# Patient Record
Sex: FEMALE | Race: BLACK OR AFRICAN AMERICAN | Employment: FULL TIME | URBAN - METROPOLITAN AREA
[De-identification: names, ages, dates, MRNs, and addresses within clinical notes are randomized per-mention and may not be internally consistent; named-entity substitution may affect disease eponyms.]

---

## 2019-07-12 ENCOUNTER — OFFICE VISIT (OUTPATIENT)
Dept: CARDIOLOGY CLINIC | Age: 29
End: 2019-07-12

## 2019-07-12 VITALS
SYSTOLIC BLOOD PRESSURE: 116 MMHG | OXYGEN SATURATION: 99 % | HEART RATE: 65 BPM | HEIGHT: 67 IN | DIASTOLIC BLOOD PRESSURE: 68 MMHG | RESPIRATION RATE: 14 BRPM | BODY MASS INDEX: 30.76 KG/M2 | WEIGHT: 196 LBS

## 2019-07-12 DIAGNOSIS — R00.2 PALPITATIONS: Primary | ICD-10-CM

## 2019-07-12 DIAGNOSIS — R94.31 ABNORMAL EKG: ICD-10-CM

## 2019-07-12 PROBLEM — I45.6 WPW (WOLFF-PARKINSON-WHITE SYNDROME): Status: ACTIVE | Noted: 2019-07-12

## 2019-07-12 RX ORDER — GUAIFENESIN 100 MG/5ML
81 LIQUID (ML) ORAL DAILY
COMMUNITY
End: 2020-10-28

## 2019-07-12 NOTE — PROGRESS NOTES
Visit Vitals  /68 (BP 1 Location: Right arm, BP Patient Position: Sitting)   Pulse 65   Resp 14   Ht 5' 7\" (1.702 m)   Wt 196 lb (88.9 kg)   SpO2 99%   BMI 30.70 kg/m²

## 2019-07-12 NOTE — PATIENT INSTRUCTIONS
You will be schedued for a 48 hour Holter and an echocardiogram. You will be scheduled to see Dr. Denia Smith.

## 2019-07-12 NOTE — PROGRESS NOTES
HISTORY OF PRESENT ILLNESS  Lalit Castillo is a 30 y.o. female. With no remarkable PMH presents here for palpitations and abnormal ecg  Past Medical History:   Diagnosis Date    Anemia NEC      No past surgical history on file. Social History     Tobacco Use    Smoking status: Never Smoker    Smokeless tobacco: Never Used   Substance Use Topics    Alcohol use: Yes     Alcohol/week: 0.5 oz     Types: 1 drink(s) per week    Drug use: No   FH: no fh for SCD     one uncle with CMP and AICD    HPI  In the last 3 weeks she has noticed palpitations with and without activities   no cp or sob or presyncope or syncope    Review of Systems   Respiratory: Negative. Cardiovascular: Positive for palpitations. Negative for chest pain, orthopnea, claudication, leg swelling and PND. Physical Exam  Physical Exam   Blood pressure 116/68, pulse 65, resp. rate 14, height 5' 7\" (1.702 m), weight 196 lb (88.9 kg), SpO2 99 %. Constitutional: She is oriented to person, place, and time. She appears well-developed and well-nourished. No distress. HENT: Head: Normocephalic. Eyes: No scleral icterus. Neck: Normal range of motion. Neck supple. No JVD present. No tracheal deviation present. Cardiovascular: Normal rate, regular rhythm, normal heart sounds and intact distal pulses. Exam reveals no gallop and no friction rub. No murmur heard. Pulmonary/Chest: Effort normal and breath sounds normal. No stridor. No respiratory distress, wheezes or  rales. Abdominal: She exhibits no distension. Musculoskeletal: She exhibits no edema. Neurological: She is alert and oriented to person, place, and time. Coordination normal.   Skin: Skin is warm. No rash noted. Not diaphoretic. No erythema. Psychiatric:  Normal mood and affect. Behavior is normal.   Current Outpatient Medications on File Prior to Visit   Medication Sig Dispense Refill    aspirin 81 mg chewable tablet Take 81 mg by mouth daily.        No current facility-administered medications on file prior to visit.         ASSESSMENT and PLAN  Palpitations: I suspect WPW based on ecg   proceed with echo and 48 hour holter and refer to EP for potential ablation  Discussed with her that this is a preliminary diagnosis but also potenial significance of wpw discussed  No caffeine for now   See her back after tests  to er if sustained palpitations  ETT also a consideration but hold for ep eval first

## 2019-07-19 ENCOUNTER — CLINICAL SUPPORT (OUTPATIENT)
Dept: CARDIOLOGY CLINIC | Age: 29
End: 2019-07-19

## 2019-07-19 DIAGNOSIS — R94.31 ABNORMAL EKG: ICD-10-CM

## 2019-07-19 DIAGNOSIS — R00.2 PALPITATIONS: ICD-10-CM

## 2019-07-23 ENCOUNTER — TELEPHONE (OUTPATIENT)
Dept: CARDIOLOGY CLINIC | Age: 29
End: 2019-07-23

## 2019-07-31 ENCOUNTER — TELEPHONE (OUTPATIENT)
Dept: CARDIOLOGY CLINIC | Age: 29
End: 2019-07-31

## 2019-08-08 ENCOUNTER — OFFICE VISIT (OUTPATIENT)
Dept: CARDIOLOGY CLINIC | Age: 29
End: 2019-08-08

## 2019-08-08 VITALS
OXYGEN SATURATION: 99 % | RESPIRATION RATE: 18 BRPM | SYSTOLIC BLOOD PRESSURE: 120 MMHG | HEIGHT: 67 IN | HEART RATE: 71 BPM | DIASTOLIC BLOOD PRESSURE: 70 MMHG | BODY MASS INDEX: 30.29 KG/M2 | WEIGHT: 193 LBS

## 2019-08-08 DIAGNOSIS — I45.6 WPW (WOLFF-PARKINSON-WHITE SYNDROME): Primary | ICD-10-CM

## 2019-08-08 DIAGNOSIS — R00.2 PALPITATIONS: ICD-10-CM

## 2019-08-08 RX ORDER — ERGOCALCIFEROL 1.25 MG/1
50000 CAPSULE ORAL
COMMUNITY
Start: 2015-05-28 | End: 2020-10-28

## 2019-08-08 NOTE — PROGRESS NOTES
Cardiac Electrophysiology OFFICE Note Subjective:  
  
Dmitry Hector is a 30 y.o. patient who is seen for evaluation of palpitations & preexcitation noted on ECG. She was kindly referred by Dr. Kristi Ba. Recent holter monitor showed preexcitation with rare PVCs. States she was not particularly symptomatic while wearing monitor. She initially saw Dr. Kristi Ba after several weeks of frequent palpitations. States lasted approx 5 seconds at a time, occurred multiple times daily, always at rest. 
 
States she has not had any episodes in the past week. She denies chest pain, PND, orthopnea, lightheadedness, syncope, or edema. Active, runs 1.5-2 miles approx 3 times per week. Plans on going into the [facility], likely in 2020. Father present at appointment today. Previous: 
Echo (2019): LVEF 61-65%, no RWMA. Trace  Holter (2019): Sinus saeed to sinus tachy with sinus arrhythmia, preexcitation. Rare PVCs. Uncle with cardiomyopathy & ICD. PGF  of MI in old age. No family history of arrhythmia. Problem List  Date Reviewed: 2019 Codes Class Noted WPW (Tram-Parkinson-White syndrome) ICD-10-CM: I45.6 ICD-9-CM: 426.7  2019 Current Outpatient Medications Medication Sig Dispense Refill  ergocalciferol (ERGOCALCIFEROL) 50,000 unit capsule Take 50,000 Units by mouth every seven (7) days.  aspirin 81 mg chewable tablet Take 81 mg by mouth daily. No Known Allergies Past Medical History:  
Diagnosis Date  Anemia NEC History reviewed. No pertinent surgical history. Family History Problem Relation Age of Onset  Hypertension Mother  Hypertension Father  Colon Polyps Father   
     partial colectomy  SLE Maternal Aunt  Diabetes Paternal Grandmother  SLE Maternal Aunt Social History Tobacco Use  Smoking status: Never Smoker  Smokeless tobacco: Never Used Substance Use Topics  Alcohol use: Yes Alcohol/week: 0.8 standard drinks Types: 1 drink(s) per week Review of Systems:  
Constitutional: Negative for fever, chills, weight loss, malaise/fatigue. HEENT: Negative for nosebleeds, vision changes. Respiratory: Negative for cough, hemoptysis Cardiovascular: Negative for chest pain, + recent palpitations, no orthopnea, claudication, leg swelling, syncope, and PND. Gastrointestinal: Negative for nausea, vomiting, diarrhea, blood in stool and melena. Genitourinary: Negative for dysuria, and hematuria. Musculoskeletal: Negative for myalgias, arthralgia. Skin: Negative for rash. Heme: Does not bleed or bruise easily. Neurological: Negative for speech change and focal weakness Objective:  
 
Visit Vitals /70 (BP 1 Location: Left arm, BP Patient Position: Sitting) Pulse 71 Resp 18 Ht 5' 7\" (1.702 m) Wt 193 lb (87.5 kg) SpO2 99% BMI 30.23 kg/m² Physical Exam:  
Constitutional: well-developed and well-nourished. No respiratory distress. Head: Normocephalic and atraumatic. Eyes: Pupils are equal, round ENT: hearing normal 
Neck: supple. No JVD present. Cardiovascular: Normal rate, regular rhythm. Exam reveals no gallop and no friction rub. No murmur heard. Pulmonary/Chest: Effort normal and breath sounds normal. No wheezes. Abdominal: Soft, no tenderness. Musculoskeletal: no edema. Neurological: alert,oriented. Skin: Skin is warm and dry Psychiatric: normal mood and affect. Behavior is normal. Judgment and thought content normal.   
 
EKG: Junctional, rate 71 bpm, preexcitation. Assessment/Plan: ICD-10-CM ICD-9-CM 1. Palpitations R00.2 785.1 2. Ventricular pre-excitation with arrhythmia I45.6 426.7 3. WPW (Tram-Parkinson-White syndrome) I45.6 426.7 Ms. Bedoya has had palpitations, has Tram-Parkinson-White syndrome noted on holter & ECG. Analysis of ECG shows accessory pathway, right posteroseptal location is likely. She will likely be unable to join the [employer] unless she eliminates accessory pathway. Risks/benefits of EP study & ablation discussed. She & father would like to proceed with scheduling. No future appointments. Thank you for involving me in this patient's care and please call with further concerns or questions. Naomi Olea M.D. Electrophysiology/Cardiology [facility] and [facility]ascular [address], [place] [address] [place] [address] 
[phone]                                        [phone]

## 2019-08-08 NOTE — PROGRESS NOTES
Cardiac Electrophysiology OFFICE Consult Note     Subjective:      Lalit Castillo is a 30 y.o. patient who is seen for evaluation of palpitations & preexcitation noted on ECG. She was kindly referred by Dr. Tristian Gibbs. Recent holter monitor showed preexcitation with rare PVCs. States she was not particularly symptomatic while wearing monitor. She initially saw Dr. Tristian Gibbs after several weeks of frequent palpitations. States lasted approx 5 seconds at a time, occurred multiple times daily, always at rest.    States she has not had any episodes in the past week. She denies chest pain, PND, orthopnea, lightheadedness, syncope, or edema. Active, runs 1.5-2 miles approx 3 times per week. Plans on going into the [facility], likely in 2020. Father present at appointment today. Previous:  Echo (2019): LVEF 61-65%, no RWMA. Trace MR. Holter (2019): Sinus saeed to sinus tachy with sinus arrhythmia, preexcitation. Rare PVCs. Uncle with cardiomyopathy & ICD. PGF  of MI in old age. No family history of arrhythmia. Problem List  Date Reviewed: 2019          Codes Class Noted    WPW (Tram-Parkinson-White syndrome) ICD-10-CM: I45.6  ICD-9-CM: 426.7  2019              Current Outpatient Medications   Medication Sig Dispense Refill    ergocalciferol (ERGOCALCIFEROL) 50,000 unit capsule Take 50,000 Units by mouth every seven (7) days.  aspirin 81 mg chewable tablet Take 81 mg by mouth daily. No Known Allergies  Past Medical History:   Diagnosis Date    Anemia NEC      History reviewed. No past surgical history.   Family History   Problem Relation Age of Onset   [address] Hypertension Mother     Hypertension Father     Colon Polyps Father         partial colectomy    SLE Maternal Aunt     Diabetes Paternal Grandmother     SLE Maternal Aunt      Social History     Tobacco Use    Smoking status: Never Smoker    Smokeless tobacco: Never Used   Substance Use Topics    Alcohol use: Yes     Alcohol/week: 0.8 standard drinks     Types: 1 drink(s) per week        Review of Systems:   Constitutional: Negative for fever, chills, weight loss, malaise/fatigue. HEENT: Negative for nosebleeds, vision changes. Respiratory: Negative for cough, hemoptysis  Cardiovascular: Negative for chest pain, + recent palpitations, no orthopnea, claudication, leg swelling, syncope, and PND. Gastrointestinal: Negative for nausea, vomiting, diarrhea, blood in stool and melena. Genitourinary: Negative for dysuria, and hematuria. Musculoskeletal: Negative for myalgias, arthralgia. Skin: Negative for rash. Heme: Does not bleed or bruise easily. Neurological: Negative for speech change and focal weakness     Objective:     Visit Vitals  /70 (BP 1 Location: Left arm, BP Patient Position: Sitting)   Pulse 71   Resp 18   Ht 5' 7\" (1.702 m)   Wt 193 lb (87.5 kg)   SpO2 99%   BMI 30.23 kg/m²      Physical Exam:   Constitutional: well-developed and well-nourished. No respiratory distress. Head: Normocephalic and atraumatic. Eyes: Pupils are equal, round  ENT: hearing normal  Neck: supple. No JVD present. Cardiovascular: Normal rate, regular rhythm. Exam reveals no gallop and no friction rub. No murmur heard. Pulmonary/Chest: Effort normal and breath sounds normal. No wheezes. Abdominal: Soft, no tenderness. Musculoskeletal: no edema. Neurological: alert,oriented. Skin: Skin is warm and dry  Psychiatric: normal mood and affect. Behavior is normal. Judgment and thought content normal.      EKG: NSR with preexcitation. Assessment/Plan:        ICD-10-CM ICD-9-CM    1. WPW (Tram-Parkinson-White syndrome) I45.6 426.7 CBC WITH AUTOMATED DIFF      METABOLIC PANEL, BASIC   2. Palpitations R00.2 785.1 CBC WITH AUTOMATED DIFF      METABOLIC PANEL, BASIC     Ms. Lucrecia Lamb has had palpitations, has Tram-Parkinson-White syndrome noted on holter & ECG and palpitation.   ECG shows preexcitation, probably right posteroseptal accessory pathway  She wants to join the [employer] and there is also indication for ablation  I discussed risks and benefits and she and her father want to proceed with EP study and ablation      Future Appointments   Date Time Provider Purvi Hogue   10/24/2019 10:40 AM Roberto Guevara  [address]         Thank you for involving me in this patient's care and please call with further concerns or questions. Deneen Larson M.D.   Electrophysiology/Cardiology  [facility] and Vascular [place]  [address], [place] [address] , [facility] 91  [address] [facility], [address]                             [address] [place] [address]  [phone]

## 2019-08-08 NOTE — PATIENT INSTRUCTIONS
Your SVT ablation procedure has been scheduled for 10/11/2019 at 1:00 pm, at [facility].    Please report to Admitting Department by 11:00, or 2 hours prior to your scheduled procedure. Please bring a list of your current medications and medication bottles, if able, to the hospital on this day. You will be unable to drive after your procedure so please make sure to bring someone with you to your procedure. You will need to have nothing to eat or drink after midnight, the night prior to your procedure. You may have small sips of water, if needed, to take with your medication. You will need labs drawn prior to your procedure. Please go to [facility] to have this done no sooner than 9/11/2019 and no later than 10/4/2019. You should Not stop your medication. After your procedure, you will need to follow up with Dr. Fabiola Hobson.  Your follow-up appointment has been scheduled for 10/24/2019 at 10:40 am.

## 2019-09-19 LAB
BASOPHILS # BLD AUTO: 0 X10E3/UL (ref 0–0.2)
BASOPHILS NFR BLD AUTO: 1 %
BUN SERPL-MCNC: 6 MG/DL (ref 6–20)
BUN/CREAT SERPL: 9 (ref 9–23)
CALCIUM SERPL-MCNC: 9.4 MG/DL (ref 8.7–10.2)
CHLORIDE SERPL-SCNC: 100 MMOL/L (ref 96–106)
CO2 SERPL-SCNC: 20 MMOL/L (ref 20–29)
CREAT SERPL-MCNC: 0.69 MG/DL (ref 0.57–1)
EOSINOPHIL # BLD AUTO: 0.1 X10E3/UL (ref 0–0.4)
EOSINOPHIL NFR BLD AUTO: 1 %
ERYTHROCYTE [DISTWIDTH] IN BLOOD BY AUTOMATED COUNT: 14.7 % (ref 12.3–15.4)
GLUCOSE SERPL-MCNC: 88 MG/DL (ref 65–99)
HCT VFR BLD AUTO: 32.5 % (ref 34–46.6)
HGB BLD-MCNC: 10.5 G/DL (ref 11.1–15.9)
IMM GRANULOCYTES # BLD AUTO: 0 X10E3/UL (ref 0–0.1)
IMM GRANULOCYTES NFR BLD AUTO: 0 %
LYMPHOCYTES # BLD AUTO: 2 X10E3/UL (ref 0.7–3.1)
LYMPHOCYTES NFR BLD AUTO: 23 %
MCH RBC QN AUTO: 26.7 PG (ref 26.6–33)
MCHC RBC AUTO-ENTMCNC: 32.3 G/DL (ref 31.5–35.7)
MCV RBC AUTO: 83 FL (ref 79–97)
MONOCYTES # BLD AUTO: 0.7 X10E3/UL (ref 0.1–0.9)
MONOCYTES NFR BLD AUTO: 8 %
NEUTROPHILS # BLD AUTO: 5.8 X10E3/UL (ref 1.4–7)
NEUTROPHILS NFR BLD AUTO: 67 %
PLATELET # BLD AUTO: 353 X10E3/UL (ref 150–450)
POTASSIUM SERPL-SCNC: 4.5 MMOL/L (ref 3.5–5.2)
RBC # BLD AUTO: 3.93 X10E6/UL (ref 3.77–5.28)
SODIUM SERPL-SCNC: 138 MMOL/L (ref 134–144)
WBC # BLD AUTO: 8.6 X10E3/UL (ref 3.4–10.8)

## 2019-09-20 NOTE — PROGRESS NOTES
Mildly anemic. Otherwise without significant lab abnormalities. OK to proceed with upcoming EP study +/- ablation as scheduled.

## 2019-10-02 RX ORDER — SODIUM CHLORIDE 0.9 % (FLUSH) 0.9 %
5-40 SYRINGE (ML) INJECTION AS NEEDED
Status: CANCELLED | OUTPATIENT
Start: 2019-10-02

## 2019-10-02 RX ORDER — SODIUM CHLORIDE 0.9 % (FLUSH) 0.9 %
5-40 SYRINGE (ML) INJECTION EVERY 8 HOURS
Status: CANCELLED | OUTPATIENT
Start: 2019-10-02

## 2019-10-11 ENCOUNTER — HOSPITAL ENCOUNTER (OUTPATIENT)
Age: 29
Setting detail: OBSERVATION
Discharge: HOME | End: 2019-10-11
Attending: INTERNAL MEDICINE | Admitting: INTERNAL MEDICINE
Payer: COMMERCIAL

## 2019-10-11 VITALS
OXYGEN SATURATION: 100 % | DIASTOLIC BLOOD PRESSURE: 65 MMHG | TEMPERATURE: 98.4 F | HEART RATE: 85 BPM | RESPIRATION RATE: 16 BRPM | SYSTOLIC BLOOD PRESSURE: 116 MMHG | HEIGHT: 67 IN | BODY MASS INDEX: 29.03 KG/M2 | WEIGHT: 185 LBS

## 2019-10-11 DIAGNOSIS — I45.6 PRE-EXCITATION ATRIOVENTRICULAR CONDUCTION: ICD-10-CM

## 2019-10-11 PROBLEM — Z98.890 S/P ABLATION OF ACCESSORY BYPASS TRACT: Status: ACTIVE | Noted: 2019-10-11

## 2019-10-11 PROBLEM — I47.1 SVT (SUPRAVENTRICULAR TACHYCARDIA) (HCC): Status: ACTIVE | Noted: 2019-10-11

## 2019-10-11 PROCEDURE — 99152 MOD SED SAME PHYS/QHP 5/>YRS: CPT | Performed by: INTERNAL MEDICINE

## 2019-10-11 PROCEDURE — C1894 INTRO/SHEATH, NON-LASER: HCPCS | Performed by: INTERNAL MEDICINE

## 2019-10-11 PROCEDURE — 93653 COMPRE EP EVAL TX SVT: CPT | Performed by: INTERNAL MEDICINE

## 2019-10-11 PROCEDURE — 99153 MOD SED SAME PHYS/QHP EA: CPT | Performed by: INTERNAL MEDICINE

## 2019-10-11 PROCEDURE — C1730 CATH, EP, 19 OR FEW ELECT: HCPCS | Performed by: INTERNAL MEDICINE

## 2019-10-11 PROCEDURE — 77030016899 HC CBL EP EXT4 BSC -B: Performed by: INTERNAL MEDICINE

## 2019-10-11 PROCEDURE — 77030033352 HC TBNG IRR PMP COOL PNT STJU -B: Performed by: INTERNAL MEDICINE

## 2019-10-11 PROCEDURE — 93613 INTRACARDIAC EPHYS 3D MAPG: CPT | Performed by: INTERNAL MEDICINE

## 2019-10-11 PROCEDURE — C1733 CATH, EP, OTHR THAN COOL-TIP: HCPCS | Performed by: INTERNAL MEDICINE

## 2019-10-11 PROCEDURE — 74011250636 HC RX REV CODE- 250/636: Performed by: INTERNAL MEDICINE

## 2019-10-11 PROCEDURE — 93623 PRGRMD STIMJ&PACG IV RX NFS: CPT | Performed by: INTERNAL MEDICINE

## 2019-10-11 PROCEDURE — 77030016894 HC CBL EP DX CATH3 STJU -B: Performed by: INTERNAL MEDICINE

## 2019-10-11 PROCEDURE — 99218 HC RM OBSERVATION: CPT

## 2019-10-11 PROCEDURE — 74011000250 HC RX REV CODE- 250: Performed by: INTERNAL MEDICINE

## 2019-10-11 PROCEDURE — C2630 CATH, EP, COOL-TIP: HCPCS | Performed by: INTERNAL MEDICINE

## 2019-10-11 PROCEDURE — 77030010872 HC CBL EP BSC -C: Performed by: INTERNAL MEDICINE

## 2019-10-11 RX ORDER — HYDROCODONE BITARTRATE AND ACETAMINOPHEN 5; 325 MG/1; MG/1
1 TABLET ORAL
Status: DISCONTINUED | OUTPATIENT
Start: 2019-10-11 | End: 2019-10-12 | Stop reason: HOSPADM

## 2019-10-11 RX ORDER — LIDOCAINE HYDROCHLORIDE 10 MG/ML
INJECTION INFILTRATION; PERINEURAL AS NEEDED
Status: DISCONTINUED | OUTPATIENT
Start: 2019-10-11 | End: 2019-10-11 | Stop reason: HOSPADM

## 2019-10-11 RX ORDER — SODIUM CHLORIDE 0.9 % (FLUSH) 0.9 %
5-40 SYRINGE (ML) INJECTION EVERY 8 HOURS
Status: DISCONTINUED | OUTPATIENT
Start: 2019-10-11 | End: 2019-10-12 | Stop reason: HOSPADM

## 2019-10-11 RX ORDER — ONDANSETRON 2 MG/ML
4 INJECTION INTRAMUSCULAR; INTRAVENOUS
Status: DISCONTINUED | OUTPATIENT
Start: 2019-10-11 | End: 2019-10-12 | Stop reason: HOSPADM

## 2019-10-11 RX ORDER — PHENAZOPYRIDINE HYDROCHLORIDE 95 MG/1
95 TABLET ORAL
COMMUNITY
End: 2020-10-28

## 2019-10-11 RX ORDER — SODIUM CHLORIDE 0.9 % (FLUSH) 0.9 %
5-40 SYRINGE (ML) INJECTION EVERY 8 HOURS
Status: DISCONTINUED | OUTPATIENT
Start: 2019-10-11 | End: 2019-10-11 | Stop reason: HOSPADM

## 2019-10-11 RX ORDER — SODIUM CHLORIDE 0.9 % (FLUSH) 0.9 %
5-40 SYRINGE (ML) INJECTION AS NEEDED
Status: DISCONTINUED | OUTPATIENT
Start: 2019-10-11 | End: 2019-10-11 | Stop reason: HOSPADM

## 2019-10-11 RX ORDER — FENTANYL CITRATE 50 UG/ML
INJECTION, SOLUTION INTRAMUSCULAR; INTRAVENOUS AS NEEDED
Status: DISCONTINUED | OUTPATIENT
Start: 2019-10-11 | End: 2019-10-11 | Stop reason: HOSPADM

## 2019-10-11 RX ORDER — MIDAZOLAM HYDROCHLORIDE 1 MG/ML
INJECTION, SOLUTION INTRAMUSCULAR; INTRAVENOUS AS NEEDED
Status: DISCONTINUED | OUTPATIENT
Start: 2019-10-11 | End: 2019-10-11 | Stop reason: HOSPADM

## 2019-10-11 RX ORDER — ACETAMINOPHEN 325 MG/1
650 TABLET ORAL
Status: DISCONTINUED | OUTPATIENT
Start: 2019-10-11 | End: 2019-10-12 | Stop reason: HOSPADM

## 2019-10-11 RX ORDER — SODIUM CHLORIDE 0.9 % (FLUSH) 0.9 %
5-40 SYRINGE (ML) INJECTION AS NEEDED
Status: DISCONTINUED | OUTPATIENT
Start: 2019-10-11 | End: 2019-10-12 | Stop reason: HOSPADM

## 2019-10-11 NOTE — PROGRESS NOTES
Cardiac Cath Lab Recovery Arrival Note:      Terrence Moss arrived to Cardiac Cath Lab, Recovery Area. Staff introduced to patient. Patient identifiers verified with NAME and DATE OF BIRTH. Procedure verified with patient. Consent forms reviewed and signed by patient or authorized representative and verified. Allergies verified. Patient and family oriented to department. Patient and family informed of procedure and plan of care. Questions answered with review. Patient prepped for procedure, per orders from physician, prior to arrival.    Patient on cardiac monitor, non-invasive blood pressure, SPO2 monitor. On Room Air. Patient is A&Ox 4. Patient reports No Pain. Patient in stretcher, in low position, with side rails up, call bell within reach, patient instructed to call if assistance as needed. Patient prep in: [address], [place]. Family in: Waiting Room.    Prep by: Lavanda Lundborg RN

## 2019-10-11 NOTE — ROUTINE PROCESS
Cardiac Cath Lab Procedure Area Arrival Note: 
 
Anastasia García arrived to Cardiac Cath Lab, Procedure Area. Patient identifiers verified with NAME and DATE OF BIRTH. Procedure verified with patient. Consent forms verified. Allergies verified. Patient informed of procedure and plan of care. Questions answered with review. Patient voiced understanding of procedure and plan of care. Patient on cardiac monitor, non-invasive blood pressure, SPO2 monitor. On  or O2 @ 2 lpm via nasal canula. IV of 0.9% NS on pump at 25 ml/hr. Patient status doing well without problems. Patient is A&Ox 4. Patient reports no pain. Patient medicated during procedure with orders obtained and verified by Dr. Patricia Castellon. Refer to patients Cardiac Cath Lab PROCEDURE REPORT for vital signs, assessment, status, and response during procedure, printed at end of case. Printed report on chart or scanned into chart.

## 2019-10-11 NOTE — ROUTINE PROCESS
TRANSFER - OUT REPORT: 
 
Verbal report given to Moe on Le Wells being transferred to cath lab recovery for routine progression of care Report consisted of patients Situation, Background, Assessment and  
Recommendations(SBAR). Information from the following report(s) Procedure Summary was reviewed with the receiving nurse. Opportunity for questions and clarification was provided. Cardiac Cath Lab Procedure Area Arrival Note: 
 
eL Wells arrived to Cardiac Cath Lab, Procedure Area. Patient identifiers verified with NAME and DATE OF BIRTH. Procedure verified with patient. Consent forms verified. Allergies verified. Patient informed of procedure and plan of care. Questions answered with review. Patient voiced understanding of procedure and plan of care. Patient on cardiac monitor, non-invasive blood pressure, SPO2 monitor. On  or O2 @ 2 lpm via nasal canula. IV of 0.9% NS on pump at 25 ml/hr. Patient status doing well without problems. Patient is A&Ox 4. Patient reports no pain. Patient medicated during procedure with orders obtained and verified by Dr. Fidel Gallagher. Refer to patients Cardiac Cath Lab PROCEDURE REPORT for vital signs, assessment, status, and response during procedure, printed at end of case. Printed report on chart or scanned into chart.

## 2019-10-11 NOTE — PROGRESS NOTES
TRANSFER - IN REPORT:    Verbal report received from Maurilio Malloy CVT on Guanakito Robertson  being received from procedure area for routine progression of care. Report consisted of patients Situation, Background, Assessment and Recommendations(SBAR). Information from the following report(s) SBAR, Procedure Summary, MAR, Recent Results and Cardiac Rhythm NSR was reviewed with the receiving clinician. Opportunity for questions and clarification was provided. Assessment completed upon patients arrival to [address] [place] [address] and care assumed. Cardiac Cath Lab Recovery Arrival Note:    Guanakito Robertson arrived to [facility] recovery area. Patient procedure= Ablation. Patient on cardiac monitor, non-invasive blood pressure, SPO2 monitor. On  O2 @ 2 lpm via NC.  IV  of NS on pump at 25 ml/hr. Patient status doing well without problems. Patient is A&Ox 4. Patient reports No Pain. PROCEDURE SITE CHECK:    Procedure site:without any bleeding and No Hematoma, No pain/discomfort reported at procedure site. No change in patient status. Continue to monitor patient and status.

## 2019-10-11 NOTE — PROGRESS NOTES
1838hrs:  TRANSFER - IN REPORT:  Verbal report received from [place], RN(name) on [facility]  being received from CCL(unit) for routine progression of care  Report consisted of patients Situation, Background, Assessment and Recommendations(SBAR). Information from the following report(s) SBAR, Procedure Summary, Intake/Output, MAR, Recent Results, Med Rec Status and Cardiac Rhythm NSR 80's was reviewed with the receiving nurse. Opportunity for questions and clarification was provided. Assessment completed upon patients arrival to unit and care assumed.

## 2019-10-11 NOTE — DISCHARGE INSTRUCTIONS
Will arrange for treadmill exercise at follow up wear comfortable clothing       Patient Instructions Post-EP study and ablation    1. No heavy lifting or exercises until Monday 10/14/2019  This includes the following:  Do not push or move furniture, jog or run    2. Do not drive for 3 days. 3.  Call Dr. Ofelia Murrieta at [phone] if you experience any of the following symptoms:  Dizziness, lightheadedness, fainting spells  Lack of energy  Shortness of breath  Rapid heart rate  Chest or muscle twitches  Blurry vision, double vision, weakness, numbness  Nausea, vomiting  Fever  Bleeding in the stool, black stool  Leg swelling, pain    4. Follow-up with Dr. Ofelia Murrieta as noted below. Future Appointments   Date Time Provider Purvi Hogue   10/24/2019 10:40 AM MD Martell Rosa M.D.   Electrophysiology/Cardiology  [facility] and Vascular [place]  [address], [place] [address] [place] [address]  [address] [facility], [address] [place] [address]  [phone]

## 2019-10-11 NOTE — H&P
Cardiac Electrophysiology H&P Note     Subjective:      Deepa Claire is a 30 y.o. patient who presents today for planned SVT ablation. She was last seen in clinic on 2019, denies significant changes in medical history or hospitalizations since then. Recent holter monitor showed preexcitation with rare PVCs. States she was not particularly symptomatic while wearing monitor.     She initially saw Dr. Brook Armendariz after several weeks of frequent palpitations. States lasted approx 5 seconds at a time, occurred multiple times daily, always at rest.     States she has not had any episodes in the past week.     She denies chest pain, PND, orthopnea, lightheadedness, syncope, or edema.       Active, runs 1.5-2 miles approx 3 times per week.     Plans on going into the [facility], likely in 2020. Previous:  Echo (2019): LVEF 61-65%, no RWMA. Trace MR.     Holter (2019): Sinus saeed to sinus tachy with sinus arrhythmia, preexcitation. Rare PVCs.     Uncle with cardiomyopathy & ICD. PGF  of MI in old age. No family history of arrhythmia.     Primary cardiologist is Dr. Brook Armendariz. Problem List  Date Reviewed: 2019          Codes Class Noted    WPW (Tram-Parkinson-White syndrome) ICD-10-CM: I45.6  ICD-9-CM: 426.7  2019                No Known Allergies  Past Medical History:   Diagnosis Date    Anemia NEC      No past surgical history on file. Family History   Problem Relation Age of Onset   [address] [facility] [address] Hypertension Mother     Hypertension Father     Colon Polyps Father         partial colectomy    SLE Maternal Aunt     Diabetes Paternal Grandmother     SLE Maternal Aunt      Social History     Tobacco Use    Smoking status: Never Smoker    Smokeless tobacco: Never Used   Substance Use Topics    Alcohol use:  Yes     Alcohol/week: 0.8 standard drinks     Types: 1 drink(s) per week        Review of Systems:   Constitutional: Negative for fever, chills, weight loss, malaise/fatigue. HEENT: Negative for nosebleeds, vision changes. Respiratory: Negative for cough, hemoptysis  Cardiovascular: Negative for chest pain, + recent palpitations, orthopnea, claudication, leg swelling, syncope, and PND. Gastrointestinal: Negative for nausea, vomiting, diarrhea, blood in stool and melena. Genitourinary: Negative for dysuria, and hematuria. Musculoskeletal: Negative for myalgias, arthralgia. Skin: Negative for rash. Heme: Does not bleed or bruise easily. Neurological: Negative for speech change and focal weakness     Objective:        Physical Exam:   Constitutional: Well-developed and well-nourished. No respiratory distress. Head: Normocephalic and atraumatic. Eyes: Pupils are equal, round  ENT: Hearing normal  Neck: Supple. No JVD present. Cardiovascular: Normal rate, regular rhythm. Exam reveals no gallop and no friction rub. No murmur heard. Pulmonary/Chest: Effort normal and breath sounds normal. No wheezes. Abdominal: Soft, no tenderness. Musculoskeletal: No edema. Neurological: Alert,oriented. Skin: Skin is warm and dry  Psychiatric: Normal mood and affect. Behavior is normal. Judgment and thought content normal.      EKG (07/12/2019):  NSR with preexcitation. Assessment/Plan:     Ms. Desiree García has had palpitations, has Arlys Stacks White syndrome noted on holter & ECG. Preexcitation noted on ECT, likely right posteroseptal accessory pathway. She would like to join the [employer], & ablation is indicated. Risks/benefits of EP study & WPW ablation reviewed, & she agrees to proceed as scheduled.     DEEPIKA Salcedo  [facility] [place]  10/11/19    Addendum from EP attending:   I have seen, examined patient, and discussed with nurse practitioner, registered nurse, reviewed, updated note and agree with the assessment and plan    I have talked to her this am. She still has palpitation     Exam shows regular rhythm and no rub  Assessment and Plan:  Continue to proceed with EP study and possible ablation       Thank you for involving me in this patient's care and please call with further concerns or questions. Kris Carney M.D.   Electrophysiology/Cardiology  [facility] and Vascular [place]  [address], [place] [address] [place] [address] 91  [address] [facility], [address] [place] [address]  [phone]

## 2019-10-11 NOTE — PROGRESS NOTES
TRANSFER - OUT REPORT:    Verbal report given to Loyda Keller RN on Nel Desai being transferred to Room 464 for routine progression of care       Report consisted of patients Situation, Background, Assessment and   Recommendations(SBAR). Information from the following report(s) SBAR, Procedure Summary, MAR, Recent Results and Cardiac Rhythm NSR was reviewed with the receiving nurse. Opportunity for questions and clarification was provided.

## 2019-10-12 NOTE — PROGRESS NOTES
Patient and her parents agree to discharge tonight 930 pm  She lives locally in [place]  Follow up in office with treadmill exercise test  Future Appointments   Date Time Provider Purvi Hogue   10/24/2019 10:40 AM Joan Morrissey  [address]

## 2019-10-24 ENCOUNTER — OFFICE VISIT (OUTPATIENT)
Dept: CARDIOLOGY CLINIC | Age: 29
End: 2019-10-24

## 2019-10-24 VITALS
BODY MASS INDEX: 29.98 KG/M2 | HEART RATE: 87 BPM | RESPIRATION RATE: 14 BRPM | HEIGHT: 67 IN | WEIGHT: 191 LBS | DIASTOLIC BLOOD PRESSURE: 58 MMHG | SYSTOLIC BLOOD PRESSURE: 98 MMHG

## 2019-10-24 DIAGNOSIS — I45.6 WPW (WOLFF-PARKINSON-WHITE SYNDROME): Primary | ICD-10-CM

## 2019-10-24 DIAGNOSIS — Z98.890 S/P ABLATION OF ACCESSORY BYPASS TRACT: ICD-10-CM

## 2019-10-24 NOTE — PROGRESS NOTES
Cardiac Electrophysiology OFFICE Note     Subjective:      Cherise Gillespie is a 30 y.o. patient who is seen for follow up, is s/p ablation of right posteroseptal accessory pathway on 10/11/2019. No induced SVT during ablation, but accessory pathway could conduct fast in antegrade fashion, not in retrograde fashion. Ablation was performed. Pathway continued to appear with preexcitation on 12 lead ECG after each ablation with sinus rate at rest.  Post ablation, antegrade conduction pathway was slower & blocked at 540 ms atrial pacing. Risk of sudden death now low post ablation. Delta waves no longer present during exercise stress test and resting ECG today. Post ablation, she reports that she had lower activity tolerance, mild dyspnea with exertion, but that this has improved & she now feels she is back at baseline. She has not started running again. She denies chest pain, palpitations, PND, orthopnea, lightheadedness, syncope, or edema. Plans on going into the [facility], likely in 2020. Father present at appointment today. Previous:  Echo (2019): LVEF 61-65%, no RWMA. Trace MR. Holter (2019): Sinus saeed to sinus tachy with sinus arrhythmia, preexcitation. Rare PVCs. Initially saw Dr. Samia Sharpe after several weeks of frequent palpitations. States lasted approx 5 seconds at a time, occurred multiple times daily, always at rest.    Uncle with cardiomyopathy & ICD. PGF  of MI in old age. No family history of arrhythmia. Primary cardiologist is Dr. Samia Sharpe.     Problem List  Date Reviewed: 10/24/2019          Codes Class Noted    [facility] ICD-10-CM: I47.1  ICD-9-CM: 427.89  10/11/2019        S/P ablation of accessory bypass tract ICD-10-CM: Z98.890  ICD-9-CM: V45.89  10/11/2019    Overview Signed 10/11/2019  6:19 PM by Louella Goodell, MD     Right posteroseptal pathway 10/11/2019             WPW (Tram-Parkinson-White syndrome) ICD-10-CM: I45.6  ICD-9-CM: 426.7  7/12/2019              Current Outpatient Medications   Medication Sig Dispense Refill    phenazopyridine (PYRIDIUM) 95 mg tab Take 95 mg by mouth.  ergocalciferol (ERGOCALCIFEROL) 50,000 unit capsule Take 50,000 Units by mouth every seven (7) days.  aspirin 81 mg chewable tablet Take 81 mg by mouth daily. No Known Allergies  Past Medical History:   Diagnosis Date    Anemia NEC      History reviewed. No past surgical history. Family History   Problem Relation Age of Onset   Robbine Searing Hypertension Mother     Hypertension Father     Colon Polyps Father         partial colectomy    SLE Maternal Aunt     Diabetes Paternal Grandmother     SLE Maternal Aunt      Social History     Tobacco Use    Smoking status: Never Smoker    Smokeless tobacco: Never Used   Substance Use Topics    Alcohol use: Yes     Alcohol/week: 0.8 standard drinks     Types: 1 drink(s) per week        Review of Systems:   Constitutional: Negative for fever, chills, weight loss, malaise/fatigue. HEENT: Negative for nosebleeds, vision changes. Respiratory: Negative for cough, hemoptysis  Cardiovascular: Negative for chest pain, palpitations, no orthopnea, claudication, leg swelling, syncope, and PND. + recent mild activity intolerance, improved. Gastrointestinal: Negative for nausea, vomiting, diarrhea, blood in stool and melena. Genitourinary: Negative for dysuria, and hematuria. Musculoskeletal: Negative for myalgias, arthralgia. Skin: Negative for rash. Heme: Does not bleed or bruise easily. Neurological: Negative for speech change and focal weakness     Objective:     Visit Vitals  BP 98/58 (BP 1 Location: Left arm, BP Patient Position: Sitting)   Pulse 87   Resp 14   Ht 5' 7\" (1.702 m)   Wt 191 lb (86.6 kg)   BMI 29.91 kg/m²      Physical Exam:   Constitutional: Well-developed and well-nourished. No respiratory distress. Head: Normocephalic and atraumatic.    Eyes: Pupils are equal, round  ENT: Hearing normal  Neck: Supple. No JVD present. Cardiovascular: Normal rate, regular rhythm. Exam reveals no gallop and no friction rub. No murmur heard. Pulmonary/Chest: Effort normal and breath sounds normal. No wheezes. Abdominal: Soft, no tenderness. Musculoskeletal: No edema. Neurological: Alert,oriented. Skin: Skin is warm and dry  Psychiatric: Normal mood and affect. Behavior is normal. Judgment and thought content normal.      EKG (07/12/2019): NSR with preexcitation. ECG at stress test 10/24/2019 NSR without preexcitation    Assessment/Plan:        ICD-10-CM ICD-9-CM    1. WPW (Tram-Parkinson-White syndrome) I45.6 426.7 EXERCISE CARDIAC STRESS TEST   2. S/P ablation of accessory bypass tract Z98.890 V45.89      Ms. Chance Otoole is s/p ablation of right posteroseptal accessory pathway on 10/11/2019, had WPW noted on holter prior to ablation. Still with pre-excitation post procedure, but antegrade conduction pathway slower & blocked at 540 ms atrial pacing. Risk of sudden death is now lower post ablation. Delta waves no longer present at rest or at peak stress during exercise stress test today (8 minute and peak HR close to 200 bpm). She plans to join the [employer]. Based on current data, she has low risk of sudden death, may complete basic training. I gave her copies of her ECGs to bring to [employer] training facility and show to physicians or trainers there    Follow up in 6-12 months if she's still in the area, would recheck ECG at that time. Future Appointments   Date Time Provider Purvi Hogue   10/20/2020  9:20 AM Brian Sage  [address]       Follow-up and Dispositions    · Return in about 1 year (around 10/24/2020). Thank you for involving me in this patient's care and please call with further concerns or questions. Juan Francisco M.D.   Electrophysiology/Cardiology  [facility] [place]  [address] [place] [address] Jacques Marcos [address] [place] [address]  [phone]

## 2019-10-24 NOTE — PROGRESS NOTES
Cardiac Electrophysiology OFFICE Note Subjective:  
  
Deepa Claire is a 30 y.o. patient who is seen for follow up, is s/p ablation of right posteroseptal accessory pathway on 10/11/2019. No induced SVT during ablation, but accessory pathway could conduct fast in antegrade fashion, not in retrograde fashion. Ablation performed. Pathway continued to appear with preexcitation on 12 lead ECG after each ablation with sinus rate at rest.  Post ablation, antegrade conduction pathway was slower & blocked at 540 ms atrial pacing. Risk of sudden death now low post ablation. Delta waves no longer present during exercise stress test today. Post ablation, she reports that she had lower activity tolerance, mild dyspnea with exertion, but that this has improved & she now feels she is back at baseline. She has not started running again. She denies chest pain, palpitations, PND, orthopnea, lightheadedness, syncope, or edema. Plans on going into the [facility], likely in 2020. Father present at appointment today. Previous: 
Echo (2019): LVEF 61-65%, no RWMA. Trace MR. Holter (2019): Sinus saeed to sinus tachy with sinus arrhythmia, preexcitation. Rare PVCs. Initially saw Dr. Brook Armendariz after several weeks of frequent palpitations. States lasted approx 5 seconds at a time, occurred multiple times daily, always at rest. 
 
Uncle with cardiomyopathy & ICD. PGF  of MI in old age. No family history of arrhythmia. Primary cardiologist is Dr. Brook Armendariz. Problem List  Date Reviewed: 10/11/2019 Codes Class Noted SVT (supraventricular tachycardia) (HCC) ICD-10-CM: I47.1 ICD-9-CM: 427.89  10/11/2019 S/P ablation of accessory bypass tract ICD-10-CM: Z98.890 ICD-9-CM: V45.89  10/11/2019 Overview Signed 10/11/2019  6:19 PM by Kathleen Silver MD  
  Right posteroseptal pathway 10/11/2019 WPW (Tram-Parkinson-White syndrome) ICD-10-CM: I45.6 ICD-9-CM: 426.7  7/12/2019 Current Outpatient Medications Medication Sig Dispense Refill  phenazopyridine (PYRIDIUM) 95 mg tab Take 95 mg by mouth.  ergocalciferol (ERGOCALCIFEROL) 50,000 unit capsule Take 50,000 Units by mouth every seven (7) days.  aspirin 81 mg chewable tablet Take 81 mg by mouth daily. No Known Allergies Past Medical History:  
Diagnosis Date  Anemia NEC History reviewed. No past surgical history. Family History Problem Relation Age of Onset  Hypertension Mother  Hypertension Father  Colon Polyps Father   
     partial colectomy  SLE Maternal Aunt  Diabetes Paternal Grandmother  SLE Maternal Aunt Social History Tobacco Use  Smoking status: Never Smoker  Smokeless tobacco: Never Used Substance Use Topics  Alcohol use: Yes Alcohol/week: 0.8 standard drinks Types: 1 drink(s) per week Review of Systems:  
Constitutional: Negative for fever, chills, weight loss, malaise/fatigue. HEENT: Negative for nosebleeds, vision changes. Respiratory: Negative for cough, hemoptysis Cardiovascular: Negative for chest pain, palpitations, no orthopnea, claudication, leg swelling, syncope, and PND. + recent mild activity intolerance, improved. Gastrointestinal: Negative for nausea, vomiting, diarrhea, blood in stool and melena. Genitourinary: Negative for dysuria, and hematuria. Musculoskeletal: Negative for myalgias, arthralgia. Skin: Negative for rash. Heme: Does not bleed or bruise easily. Neurological: Negative for speech change and focal weakness Objective:  
 
Visit Vitals BP 98/58 (BP 1 Location: Left arm, BP Patient Position: Sitting) Pulse 87 Resp 14 Ht 5' 7\" (1.702 m) Wt 191 lb (86.6 kg) BMI 29.91 kg/m² Physical Exam:  
Constitutional: Well-developed and well-nourished. No respiratory distress. Head: Normocephalic and atraumatic. Eyes: Pupils are equal, round ENT: Hearing normal 
Neck: Supple. No JVD present. Cardiovascular: Normal rate, slightly irregular rhythm. Exam reveals no gallop and no friction rub. No murmur heard. Pulmonary/Chest: Effort normal and breath sounds normal. No wheezes. Abdominal: Soft, no tenderness. Musculoskeletal: No edema. Neurological: Alert,oriented. Skin: Skin is warm and dry Psychiatric: Normal mood and affect. Behavior is normal. Judgment and thought content normal.   
 
EKG (07/12/2019): NSR with preexcitation. Assessment/Plan: ICD-10-CM ICD-9-CM 1. PVC (premature ventricular contraction) I49.3 427.69 EXERCISE CARDIAC STRESS TEST 2. WPW (Tram-Parkinson-White syndrome) I45.6 426.7 3. S/P RF ablation operation for arrhythmia Z98.890 V45.89   
 Z86.79    
 
Ms. Chance Otoole is s/p ablation of right posteroseptal accessory pathway on 10/11/2019, had WPW noted on holter prior to ablation. Mirela Frizzle Still with pre-excitation post procedure, but antegrade conduction pathway slower & blocked at 540 ms atrial pacing. Risk of sudden death is now lower post ablation. Delta waves no longer present at rest or at peak stress during exercise stress test today. She plans to join the [employer]. Based on current data, she has low risk of sudden death, may complete basic training. Follow up in 6-12 months if she's still in the area, would recheck ECG at that time. No future appointments. Thank you for involving me in this patient's care and please call with further concerns or questions. Juan Francisco M.D. Electrophysiology/Cardiology [facility] [place] [address] [place] [address] 
[phone]

## 2020-10-28 ENCOUNTER — OFFICE VISIT (OUTPATIENT)
Dept: CARDIOLOGY CLINIC | Age: 30
End: 2020-10-28
Payer: COMMERCIAL

## 2020-10-28 VITALS
WEIGHT: 191 LBS | RESPIRATION RATE: 16 BRPM | HEIGHT: 67 IN | SYSTOLIC BLOOD PRESSURE: 112 MMHG | DIASTOLIC BLOOD PRESSURE: 62 MMHG | BODY MASS INDEX: 29.98 KG/M2 | HEART RATE: 76 BPM

## 2020-10-28 DIAGNOSIS — Z98.890 S/P ABLATION OF ACCESSORY BYPASS TRACT: ICD-10-CM

## 2020-10-28 DIAGNOSIS — I45.6 WPW (WOLFF-PARKINSON-WHITE SYNDROME): Primary | ICD-10-CM

## 2020-10-28 PROCEDURE — 93000 ELECTROCARDIOGRAM COMPLETE: CPT | Performed by: INTERNAL MEDICINE

## 2020-10-28 PROCEDURE — 99214 OFFICE O/P EST MOD 30 MIN: CPT | Performed by: INTERNAL MEDICINE

## 2020-10-28 RX ORDER — BISMUTH SUBSALICYLATE 262 MG
1 TABLET,CHEWABLE ORAL DAILY
COMMUNITY

## 2020-10-28 NOTE — PROGRESS NOTES
Cardiac Electrophysiology OFFICE Note     Subjective:      Sae Bains is a 35 y.o. patient who is seen for follow up, is s/p ablation of right posteroseptal accessory pathway on 10/11/2019. No induced SVT during ablation, but accessory pathway could conduct fast in antegrade fashion, not in retrograde fashion. She was going to join [employer] back then, ablation was performed. Pathway continued to appear with preexcitation on 12 lead ECG after each ablation with sinus rate at rest.  Post ablation, antegrade conduction pathway was slower & blocked at 540 ms atrial pacing. Risk of sudden death was low post ablation. At follow up:  Delta waves were no longer present during exercise stress test and resting ECG     She denies chest pain, palpitations, PND, orthopnea, lightheadedness, syncope, or edema. She no longer join [employer] and is working with a [employer] from home  She exercises and feels no palpitation   No dizziness or syncope     Previous:  Echo (2019): LVEF 61-65%, no RWMA. Trace MR. Holter (2019): Sinus saeed to sinus tachy with sinus arrhythmia, preexcitation. Rare PVCs. Initially saw Dr. Corbin Zepeda after several weeks of frequent palpitations. States lasted approx 5 seconds at a time, occurred multiple times daily, always at rest.    Uncle with cardiomyopathy & ICD. PGF  of MI in old age. No family history of arrhythmia.        Problem List  Date Reviewed: 10/28/2020          Codes Class Noted    SVT (supraventricular tachycardia) ([facility] ICD-10-CM: I47.1  ICD-9-CM: 427.89  10/11/2019        S/P ablation of accessory bypass tract ICD-10-CM: Z98.890  ICD-9-CM: V45.89  10/11/2019    Overview Signed 10/11/2019  6:19 PM by Florina Gill MD     Right posteroseptal pathway 10/11/2019             WPW (Tram-Parkinson-White syndrome) ICD-10-CM: I45.6  ICD-9-CM: 426.7  2019              Current Outpatient Medications   Medication Sig Dispense Refill    multivitamin (ONE A DAY) tablet Take 1 Tab by mouth daily.  phenazopyridine (PYRIDIUM) 95 mg tab Take 95 mg by mouth.  ergocalciferol (ERGOCALCIFEROL) 50,000 unit capsule Take 50,000 Units by mouth every seven (7) days.  aspirin 81 mg chewable tablet Take 81 mg by mouth daily. No Known Allergies  Past Medical History:   Diagnosis Date    Anemia NEC      History reviewed. No past surgical history. Family History   Problem Relation Age of Onset   Corey Gondola Hypertension Mother     Hypertension Father     Colon Polyps Father         partial colectomy    SLE Maternal Aunt     Diabetes Paternal Grandmother     SLE Maternal Aunt      Social History     Tobacco Use    Smoking status: Never Smoker    Smokeless tobacco: Never Used   Substance Use Topics    Alcohol use: Yes     Alcohol/week: 0.8 standard drinks     Types: 1 drink(s) per week        Review of Systems: all other systems are negative  Constitutional: Negative for fever, chills, weight loss, malaise/fatigue. HEENT: Negative for nosebleeds, vision changes. Respiratory: Negative for cough, hemoptysis  Cardiovascular: Negative for chest pain, palpitations, no orthopnea, claudication, leg swelling, syncope, and PND. Gastrointestinal: Negative for nausea, vomiting, diarrhea, blood in stool and melena. Genitourinary: Negative for dysuria, and hematuria. Musculoskeletal: Negative for myalgias, arthralgia. Skin: Negative for rash. Heme: Does not bleed or bruise easily. Neurological: Negative for speech change and focal weakness     Objective:     Visit Vitals  /62 (BP 1 Location: Left arm, BP Patient Position: Sitting)   Pulse 76   Resp 16   Ht 5' 7\" (1.702 m)   Wt 191 lb (86.6 kg)   BMI 29.91 kg/m²      Physical Exam:   Constitutional: Well-developed and well-nourished. No respiratory distress. Head: Normocephalic and atraumatic. Eyes: Pupils are equal, round  ENT: Hearing normal  Neck: Supple. No JVD present.    Cardiovascular: Normal rate, regular rhythm. Exam reveals no gallop and no friction rub. No murmur heard. Pulmonary/Chest: Effort normal and breath sounds normal. No wheezes. Abdominal: Soft, no tenderness. Musculoskeletal: No edema. Neurological: Alert,oriented. Skin: Skin is warm and dry  Psychiatric: Normal mood and affect. Behavior is normal. Judgment and thought content normal.      EKG (07/12/2019): NSR with preexcitation. ECG at stress test 10/24/2019 NSR without preexcitation  ECG 10/28/2020 normal sinus rhythm no preexcitation     Assessment/Plan:        ICD-10-CM ICD-9-CM    1. WPW (Tram-Parkinson-White syndrome)  I45.6 426.7    2. S/P ablation of accessory bypass tract  Z98.890 V45.89      MsCastillo Simons is s/p ablation of right posteroseptal accessory pathway on 10/11/2019, had WPW noted on holter prior to ablation. Delta waves no longer present at rest or at peak stress during exercise stress test last year (8 minute and peak HR close to 200 bpm). She continues to do well a year later post ablation   She is able to exercise and has no palpitation   ECG today does not show preexcitation in NSR  She can follow up PRN     Thank you for involving me in this patient's care and please call with further concerns or questions. Terry Mobley M.D.   Electrophysiology/Cardiology  [facility] and [facility] [place]  [address] , [facility] [address]  [address]  [phone]

## 2022-03-19 PROBLEM — I47.1 SVT (SUPRAVENTRICULAR TACHYCARDIA) (HCC): Status: ACTIVE | Noted: 2019-10-11

## 2022-03-19 PROBLEM — I47.10 SVT (SUPRAVENTRICULAR TACHYCARDIA): Status: ACTIVE | Noted: 2019-10-11

## 2022-03-19 PROBLEM — I45.6 WPW (WOLFF-PARKINSON-WHITE SYNDROME): Status: ACTIVE | Noted: 2019-07-12

## 2022-03-19 PROBLEM — Z98.890 S/P ABLATION OF ACCESSORY BYPASS TRACT: Status: ACTIVE | Noted: 2019-10-11

## 2023-06-26 ENCOUNTER — OFFICE VISIT (OUTPATIENT)
Facility: CLINIC | Age: 33
End: 2023-06-26
Payer: COMMERCIAL

## 2023-06-26 VITALS
WEIGHT: 214 LBS | SYSTOLIC BLOOD PRESSURE: 115 MMHG | TEMPERATURE: 98.7 F | OXYGEN SATURATION: 97 % | HEART RATE: 85 BPM | DIASTOLIC BLOOD PRESSURE: 53 MMHG | BODY MASS INDEX: 33.59 KG/M2 | RESPIRATION RATE: 19 BRPM | HEIGHT: 67 IN

## 2023-06-26 DIAGNOSIS — Z76.89 ENCOUNTER TO ESTABLISH CARE: ICD-10-CM

## 2023-06-26 DIAGNOSIS — Z11.4 SCREENING FOR HIV WITHOUT PRESENCE OF RISK FACTORS: ICD-10-CM

## 2023-06-26 DIAGNOSIS — Z13.228 SCREENING FOR ENDOCRINE, NUTRITIONAL, METABOLIC AND IMMUNITY DISORDER: ICD-10-CM

## 2023-06-26 DIAGNOSIS — Z13.29 SCREENING FOR ENDOCRINE, NUTRITIONAL, METABOLIC AND IMMUNITY DISORDER: ICD-10-CM

## 2023-06-26 DIAGNOSIS — Z71.89 ADVANCE CARE PLANNING: ICD-10-CM

## 2023-06-26 DIAGNOSIS — Z13.220 SCREENING FOR LIPID DISORDERS: ICD-10-CM

## 2023-06-26 DIAGNOSIS — Z13.0 SCREENING FOR ENDOCRINE, NUTRITIONAL, METABOLIC AND IMMUNITY DISORDER: ICD-10-CM

## 2023-06-26 DIAGNOSIS — R61 NIGHT SWEATS: ICD-10-CM

## 2023-06-26 DIAGNOSIS — Z00.00 ADULT GENERAL MEDICAL EXAMINATION: Primary | ICD-10-CM

## 2023-06-26 DIAGNOSIS — Z11.59 NEED FOR HEPATITIS C SCREENING TEST: ICD-10-CM

## 2023-06-26 DIAGNOSIS — Z13.21 SCREENING FOR ENDOCRINE, NUTRITIONAL, METABOLIC AND IMMUNITY DISORDER: ICD-10-CM

## 2023-06-26 PROCEDURE — 99395 PREV VISIT EST AGE 18-39: CPT | Performed by: NURSE PRACTITIONER

## 2023-06-26 RX ORDER — CLINDAMYCIN PHOSPHATE 11.9 MG/ML
SOLUTION TOPICAL 2 TIMES DAILY
COMMUNITY
Start: 2022-07-03 | End: 2023-07-03

## 2023-06-26 RX ORDER — ADAPALENE GEL USP, 0.3% 3 MG/G
GEL TOPICAL
COMMUNITY
Start: 2023-05-21

## 2023-06-26 RX ORDER — TRIAMCINOLONE ACETONIDE 1 MG/ML
LOTION TOPICAL 2 TIMES DAILY
COMMUNITY
Start: 2023-05-21

## 2023-06-26 SDOH — ECONOMIC STABILITY: INCOME INSECURITY: HOW HARD IS IT FOR YOU TO PAY FOR THE VERY BASICS LIKE FOOD, HOUSING, MEDICAL CARE, AND HEATING?: NOT HARD AT ALL

## 2023-06-26 SDOH — ECONOMIC STABILITY: FOOD INSECURITY: WITHIN THE PAST 12 MONTHS, YOU WORRIED THAT YOUR FOOD WOULD RUN OUT BEFORE YOU GOT MONEY TO BUY MORE.: NEVER TRUE

## 2023-06-26 SDOH — ECONOMIC STABILITY: FOOD INSECURITY: WITHIN THE PAST 12 MONTHS, THE FOOD YOU BOUGHT JUST DIDN'T LAST AND YOU DIDN'T HAVE MONEY TO GET MORE.: NEVER TRUE

## 2023-06-26 SDOH — ECONOMIC STABILITY: HOUSING INSECURITY
IN THE LAST 12 MONTHS, WAS THERE A TIME WHEN YOU DID NOT HAVE A STEADY PLACE TO SLEEP OR SLEPT IN A SHELTER (INCLUDING NOW)?: NO

## 2023-06-26 ASSESSMENT — PATIENT HEALTH QUESTIONNAIRE - PHQ9
1. LITTLE INTEREST OR PLEASURE IN DOING THINGS: 0
SUM OF ALL RESPONSES TO PHQ QUESTIONS 1-9: 0
SUM OF ALL RESPONSES TO PHQ9 QUESTIONS 1 & 2: 0
SUM OF ALL RESPONSES TO PHQ QUESTIONS 1-9: 0
2. FEELING DOWN, DEPRESSED OR HOPELESS: 0

## 2023-06-26 ASSESSMENT — ANXIETY QUESTIONNAIRES
6. BECOMING EASILY ANNOYED OR IRRITABLE: 0
3. WORRYING TOO MUCH ABOUT DIFFERENT THINGS: 0
2. NOT BEING ABLE TO STOP OR CONTROL WORRYING: 0
5. BEING SO RESTLESS THAT IT IS HARD TO SIT STILL: 0
4. TROUBLE RELAXING: 0
1. FEELING NERVOUS, ANXIOUS, OR ON EDGE: 0
7. FEELING AFRAID AS IF SOMETHING AWFUL MIGHT HAPPEN: 0
GAD7 TOTAL SCORE: 0
IF YOU CHECKED OFF ANY PROBLEMS ON THIS QUESTIONNAIRE, HOW DIFFICULT HAVE THESE PROBLEMS MADE IT FOR YOU TO DO YOUR WORK, TAKE CARE OF THINGS AT HOME, OR GET ALONG WITH OTHER PEOPLE: NOT DIFFICULT AT ALL

## 2023-06-27 ENCOUNTER — CLINICAL DOCUMENTATION (OUTPATIENT)
Dept: CASE MANAGEMENT | Age: 33
End: 2023-06-27

## 2023-06-30 DIAGNOSIS — Z13.228 SCREENING FOR ENDOCRINE, NUTRITIONAL, METABOLIC AND IMMUNITY DISORDER: ICD-10-CM

## 2023-06-30 DIAGNOSIS — Z11.4 SCREENING FOR HIV WITHOUT PRESENCE OF RISK FACTORS: ICD-10-CM

## 2023-06-30 DIAGNOSIS — Z13.21 SCREENING FOR ENDOCRINE, NUTRITIONAL, METABOLIC AND IMMUNITY DISORDER: ICD-10-CM

## 2023-06-30 DIAGNOSIS — Z13.0 SCREENING FOR ENDOCRINE, NUTRITIONAL, METABOLIC AND IMMUNITY DISORDER: ICD-10-CM

## 2023-06-30 DIAGNOSIS — Z13.29 SCREENING FOR ENDOCRINE, NUTRITIONAL, METABOLIC AND IMMUNITY DISORDER: ICD-10-CM

## 2023-06-30 DIAGNOSIS — Z11.59 NEED FOR HEPATITIS C SCREENING TEST: ICD-10-CM

## 2023-06-30 DIAGNOSIS — Z13.220 SCREENING FOR LIPID DISORDERS: ICD-10-CM

## 2023-06-30 DIAGNOSIS — Z00.00 ADULT GENERAL MEDICAL EXAMINATION: ICD-10-CM

## 2023-07-01 LAB
ALBUMIN SERPL-MCNC: 4 G/DL (ref 3.5–5)
ALBUMIN/GLOB SERPL: 1.1 (ref 1.1–2.2)
ALP SERPL-CCNC: 69 U/L (ref 45–117)
ALT SERPL-CCNC: 14 U/L (ref 12–78)
ANION GAP SERPL CALC-SCNC: 5 MMOL/L (ref 5–15)
AST SERPL-CCNC: 13 U/L (ref 15–37)
BASOPHILS # BLD: 0.1 K/UL (ref 0–0.1)
BASOPHILS NFR BLD: 1 % (ref 0–1)
BILIRUB SERPL-MCNC: 0.3 MG/DL (ref 0.2–1)
BUN SERPL-MCNC: 10 MG/DL (ref 6–20)
BUN/CREAT SERPL: 14 (ref 12–20)
CALCIUM SERPL-MCNC: 8.9 MG/DL (ref 8.5–10.1)
CHLORIDE SERPL-SCNC: 107 MMOL/L (ref 97–108)
CHOLEST SERPL-MCNC: 154 MG/DL
CO2 SERPL-SCNC: 24 MMOL/L (ref 21–32)
CREAT SERPL-MCNC: 0.74 MG/DL (ref 0.55–1.02)
DIFFERENTIAL METHOD BLD: ABNORMAL
EOSINOPHIL # BLD: 0.1 K/UL (ref 0–0.4)
EOSINOPHIL NFR BLD: 1 % (ref 0–7)
ERYTHROCYTE [DISTWIDTH] IN BLOOD BY AUTOMATED COUNT: 14.4 % (ref 11.5–14.5)
EST. AVERAGE GLUCOSE BLD GHB EST-MCNC: 97 MG/DL
GLOBULIN SER CALC-MCNC: 3.5 G/DL (ref 2–4)
GLUCOSE SERPL-MCNC: 89 MG/DL (ref 65–100)
HBA1C MFR BLD: 5 % (ref 4–5.6)
HCT VFR BLD AUTO: 33.4 % (ref 35–47)
HDLC SERPL-MCNC: 65 MG/DL
HDLC SERPL: 2.4 (ref 0–5)
HGB BLD-MCNC: 10.3 G/DL (ref 11.5–16)
HIV 1+2 AB+HIV1 P24 AG SERPL QL IA: NONREACTIVE
HIV 1/2 RESULT COMMENT: NORMAL
IMM GRANULOCYTES # BLD AUTO: 0 K/UL (ref 0–0.04)
IMM GRANULOCYTES NFR BLD AUTO: 0 % (ref 0–0.5)
LDLC SERPL CALC-MCNC: 78 MG/DL (ref 0–100)
LYMPHOCYTES # BLD: 2.1 K/UL (ref 0.8–3.5)
LYMPHOCYTES NFR BLD: 23 % (ref 12–49)
MCH RBC QN AUTO: 26.2 PG (ref 26–34)
MCHC RBC AUTO-ENTMCNC: 30.8 G/DL (ref 30–36.5)
MCV RBC AUTO: 85 FL (ref 80–99)
MONOCYTES # BLD: 0.7 K/UL (ref 0–1)
MONOCYTES NFR BLD: 8 % (ref 5–13)
NEUTS SEG # BLD: 6.1 K/UL (ref 1.8–8)
NEUTS SEG NFR BLD: 67 % (ref 32–75)
NRBC # BLD: 0 K/UL (ref 0–0.01)
NRBC BLD-RTO: 0 PER 100 WBC
PLATELET # BLD AUTO: 296 K/UL (ref 150–400)
PMV BLD AUTO: 10.7 FL (ref 8.9–12.9)
POTASSIUM SERPL-SCNC: 3.9 MMOL/L (ref 3.5–5.1)
PROT SERPL-MCNC: 7.5 G/DL (ref 6.4–8.2)
RBC # BLD AUTO: 3.93 M/UL (ref 3.8–5.2)
SODIUM SERPL-SCNC: 136 MMOL/L (ref 136–145)
TRIGL SERPL-MCNC: 55 MG/DL
TSH SERPL DL<=0.05 MIU/L-ACNC: 0.64 UIU/ML (ref 0.36–3.74)
VLDLC SERPL CALC-MCNC: 11 MG/DL
WBC # BLD AUTO: 9.1 K/UL (ref 3.6–11)

## 2023-07-02 LAB
HCV RNA, QN (IU/ML): NORMAL IU/ML
TEST INFORMATION: NORMAL

## 2023-07-05 ENCOUNTER — CLINICAL DOCUMENTATION (OUTPATIENT)
Dept: CASE MANAGEMENT | Age: 33
End: 2023-07-05

## 2023-07-05 PROBLEM — D50.8 IRON DEFICIENCY ANEMIA SECONDARY TO INADEQUATE DIETARY IRON INTAKE: Status: ACTIVE | Noted: 2023-07-05

## 2023-07-05 NOTE — RESULT ENCOUNTER NOTE
Overall your labs look good, but... Aim to eat an IRON RICH DIET. Also taking over the counter ferrous sulfate (iron) tablets ONCE DAILY on an empty stomach with Vitamin C (like OJ) will help with your low hemoglobin (Anemia). The side effects of iron are primarily GI in type, such as cramping, constipation, black stools. Start with low dose of ferrous sulfate 325 mg once daily, and if tolerated, increase dose to 2-3 tabs daily.

## 2023-07-06 LAB
M TB IFN-G BLD-IMP: NEGATIVE
M TB IFN-G CD4+ T-CELLS BLD-ACNC: 0.07 IU/ML
M TBIFN-G CD4+ CD8+T-CELLS BLD-ACNC: 0.07 IU/ML
QUANTIFERON CRITERIA: NORMAL
QUANTIFERON MITOGEN VALUE: >10 IU/ML
QUANTIFERON NIL VALUE: 0.03 IU/ML

## 2023-07-14 ENCOUNTER — CLINICAL DOCUMENTATION (OUTPATIENT)
Dept: CASE MANAGEMENT | Age: 33
End: 2023-07-14

## 2024-02-22 ENCOUNTER — OFFICE VISIT (OUTPATIENT)
Facility: CLINIC | Age: 34
End: 2024-02-22
Payer: COMMERCIAL

## 2024-02-22 VITALS
BODY MASS INDEX: 32.96 KG/M2 | WEIGHT: 210 LBS | HEART RATE: 77 BPM | DIASTOLIC BLOOD PRESSURE: 69 MMHG | SYSTOLIC BLOOD PRESSURE: 113 MMHG | RESPIRATION RATE: 19 BRPM | HEIGHT: 67 IN | OXYGEN SATURATION: 100 % | TEMPERATURE: 97.3 F

## 2024-02-22 DIAGNOSIS — Z71.84 ENCOUNTER FOR HEALTH COUNSELING RELATED TO TRAVEL: Primary | ICD-10-CM

## 2024-02-22 DIAGNOSIS — Z23 NEED FOR PROPHYLACTIC VACCINATION AGAINST DIPHTHERIA-TETANUS-PERTUSSIS (DTP): ICD-10-CM

## 2024-02-22 DIAGNOSIS — Z23 NEED FOR PROPHYLACTIC VACCINATION AGAINST HEPATITIS B VIRUS: ICD-10-CM

## 2024-02-22 PROCEDURE — 90715 TDAP VACCINE 7 YRS/> IM: CPT | Performed by: NURSE PRACTITIONER

## 2024-02-22 PROCEDURE — 90746 HEPB VACCINE 3 DOSE ADULT IM: CPT | Performed by: NURSE PRACTITIONER

## 2024-02-22 PROCEDURE — 90472 IMMUNIZATION ADMIN EACH ADD: CPT | Performed by: NURSE PRACTITIONER

## 2024-02-22 PROCEDURE — 90471 IMMUNIZATION ADMIN: CPT | Performed by: NURSE PRACTITIONER

## 2024-02-22 PROCEDURE — 99213 OFFICE O/P EST LOW 20 MIN: CPT | Performed by: NURSE PRACTITIONER

## 2024-02-22 RX ORDER — ATOVAQUONE AND PROGUANIL HYDROCHLORIDE 250; 100 MG/1; MG/1
1 TABLET, FILM COATED ORAL DAILY
Qty: 16 TABLET | Refills: 0 | Status: SHIPPED | OUTPATIENT
Start: 2024-02-22

## 2024-02-22 RX ORDER — CLINDAMYCIN PHOSPHATE AND BENZOYL PEROXIDE 10; 50 MG/G; MG/G
GEL TOPICAL DAILY
COMMUNITY
Start: 2024-01-31

## 2024-02-22 RX ORDER — SPIRONOLACTONE 100 MG/1
100 TABLET, FILM COATED ORAL DAILY
COMMUNITY
Start: 2024-01-31 | End: 2025-01-30

## 2024-02-22 ASSESSMENT — PATIENT HEALTH QUESTIONNAIRE - PHQ9
SUM OF ALL RESPONSES TO PHQ QUESTIONS 1-9: 0
2. FEELING DOWN, DEPRESSED OR HOPELESS: 0
1. LITTLE INTEREST OR PLEASURE IN DOING THINGS: 0
SUM OF ALL RESPONSES TO PHQ QUESTIONS 1-9: 0
SUM OF ALL RESPONSES TO PHQ QUESTIONS 1-9: 0
SUM OF ALL RESPONSES TO PHQ9 QUESTIONS 1 & 2: 0
SUM OF ALL RESPONSES TO PHQ QUESTIONS 1-9: 0

## 2024-02-22 NOTE — PROGRESS NOTES
Pt is here for   Chief Complaint   Patient presents with    Immunizations     Yellow fever, typhoid,     request for medication     Malaria medication      1. Have you been to the ER, urgent care clinic since your last visit?  Hospitalized since your last visit?No    2. Have you seen or consulted any other health care providers outside of the [facility] since your last visit?  Include any pap smears or colon screening. No

## 2024-02-22 NOTE — PROGRESS NOTES
Rox Pineda 1990 is a 33 y.o. female, Established patient, here for evaluation of the following chief complaint(s):  Immunizations (Yellow fever, typhoid, ) and request for medication (Malaria medication )      ASSESSMENT/PLAN:  Below is the assessment and plan developed based on review of pertinent history, physical exam, labs, studies, and medications.    1. Encounter for health counseling related to travel  Reviewed documented immunization record and printed for pt to have. Encouraged to also have other immunizations not dispensed here available for review to support Hep A and polio vaccines.  - atovaquone-proguanil (MALARONE) 250-100 MG per tablet; Take 1 tablet by mouth daily Start 1-2 days BEFORE trip, continue throughout the stay and for 7 days after returning  Dispense: 16 tablet; Refill: 0  - yellow fever (YF-VAX) injection; Inject 0.5 mLs into the skin once for 1 dose  Dispense: 0.5 mL; Refill: 0    2. Need for prophylactic vaccination against hepatitis B virus  Immunization given today.   - Hep B, ENGERIX-B, (age 20 yrs+), IM, 1mL, 3-dose    3. Need for prophylactic vaccination against diphtheria-tetanus-pertussis (DTP)  Booster given today.  - Tdap (age 10y and older) IM (Boostrix)            Follow-up and Dispositions    Return for Keep appt as scheduled.         Pt asked to complete follow by next visit: Call if any problems    SUBJECTIVE/OBJECTIVE:  HPI    Travel consultation:  Pt presents for travel vaccination review. Planning trip to [facility] on 3/16, with travel through [facility], is high risk zone for POLIO. Planned visit in this area for [facility] and [facility]. Leaving 3/24. Current immunization history: Hep A UTD, Hep B UTD, Flu n/a UTD, Polio UTD, Varicella UTD, MMR UTD, TDaP UTD, COVID full set, no boosters. Needs  yellow fever & malaria vaccines. No Typhoid needed.    Also discussed precautions for polio, rabies, diptheria, and chaga as listed on CDC Travel site.       Review of

## 2024-06-27 ENCOUNTER — OFFICE VISIT (OUTPATIENT)
Facility: CLINIC | Age: 34
End: 2024-06-27
Payer: COMMERCIAL

## 2024-06-27 VITALS
SYSTOLIC BLOOD PRESSURE: 110 MMHG | HEART RATE: 92 BPM | WEIGHT: 211 LBS | BODY MASS INDEX: 33.12 KG/M2 | OXYGEN SATURATION: 100 % | DIASTOLIC BLOOD PRESSURE: 63 MMHG | HEIGHT: 67 IN | RESPIRATION RATE: 18 BRPM | TEMPERATURE: 98.5 F

## 2024-06-27 DIAGNOSIS — Z00.00 ADULT GENERAL MEDICAL EXAMINATION: ICD-10-CM

## 2024-06-27 DIAGNOSIS — D50.8 IRON DEFICIENCY ANEMIA SECONDARY TO INADEQUATE DIETARY IRON INTAKE: ICD-10-CM

## 2024-06-27 DIAGNOSIS — J33.9 NASAL POLYPS: ICD-10-CM

## 2024-06-27 DIAGNOSIS — Z13.228 SCREENING FOR ENDOCRINE, NUTRITIONAL, METABOLIC AND IMMUNITY DISORDER: ICD-10-CM

## 2024-06-27 DIAGNOSIS — Z13.21 SCREENING FOR ENDOCRINE, NUTRITIONAL, METABOLIC AND IMMUNITY DISORDER: ICD-10-CM

## 2024-06-27 DIAGNOSIS — Z13.29 SCREENING FOR ENDOCRINE, NUTRITIONAL, METABOLIC AND IMMUNITY DISORDER: ICD-10-CM

## 2024-06-27 DIAGNOSIS — Z13.0 SCREENING FOR ENDOCRINE, NUTRITIONAL, METABOLIC AND IMMUNITY DISORDER: ICD-10-CM

## 2024-06-27 DIAGNOSIS — Z13.220 SCREENING FOR LIPID DISORDERS: ICD-10-CM

## 2024-06-27 DIAGNOSIS — Z00.00 ADULT GENERAL MEDICAL EXAMINATION: Primary | ICD-10-CM

## 2024-06-27 DIAGNOSIS — J30.89 ENVIRONMENTAL AND SEASONAL ALLERGIES: ICD-10-CM

## 2024-06-27 DIAGNOSIS — Z71.89 ADVANCE CARE PLANNING: ICD-10-CM

## 2024-06-27 PROBLEM — I47.10 SVT (SUPRAVENTRICULAR TACHYCARDIA) (HCC): Status: RESOLVED | Noted: 2019-10-11 | Resolved: 2024-06-27

## 2024-06-27 LAB
ANION GAP SERPL CALC-SCNC: 6 MMOL/L (ref 5–15)
BASOPHILS # BLD: 0.1 K/UL (ref 0–0.1)
BASOPHILS NFR BLD: 1 % (ref 0–1)
BUN SERPL-MCNC: 5 MG/DL (ref 6–20)
BUN/CREAT SERPL: 6 (ref 12–20)
CALCIUM SERPL-MCNC: 9.1 MG/DL (ref 8.5–10.1)
CHLORIDE SERPL-SCNC: 105 MMOL/L (ref 97–108)
CHOLEST SERPL-MCNC: 169 MG/DL
CO2 SERPL-SCNC: 27 MMOL/L (ref 21–32)
CREAT SERPL-MCNC: 0.77 MG/DL (ref 0.55–1.02)
DIFFERENTIAL METHOD BLD: NORMAL
EOSINOPHIL # BLD: 0.1 K/UL (ref 0–0.4)
EOSINOPHIL NFR BLD: 1 % (ref 0–7)
ERYTHROCYTE [DISTWIDTH] IN BLOOD BY AUTOMATED COUNT: 13.2 % (ref 11.5–14.5)
GLUCOSE SERPL-MCNC: 94 MG/DL (ref 65–100)
HCT VFR BLD AUTO: 35.2 % (ref 35–47)
HDLC SERPL-MCNC: 79 MG/DL
HDLC SERPL: 2.1 (ref 0–5)
HGB BLD-MCNC: 11.5 G/DL (ref 11.5–16)
IMM GRANULOCYTES # BLD AUTO: 0 K/UL (ref 0–0.04)
IMM GRANULOCYTES NFR BLD AUTO: 0 % (ref 0–0.5)
LDLC SERPL CALC-MCNC: 76.4 MG/DL (ref 0–100)
LYMPHOCYTES # BLD: 1.9 K/UL (ref 0.8–3.5)
LYMPHOCYTES NFR BLD: 20 % (ref 12–49)
MCH RBC QN AUTO: 28.3 PG (ref 26–34)
MCHC RBC AUTO-ENTMCNC: 32.7 G/DL (ref 30–36.5)
MCV RBC AUTO: 86.7 FL (ref 80–99)
MONOCYTES # BLD: 0.8 K/UL (ref 0–1)
MONOCYTES NFR BLD: 8 % (ref 5–13)
NEUTS SEG # BLD: 6.5 K/UL (ref 1.8–8)
NEUTS SEG NFR BLD: 70 % (ref 32–75)
NRBC # BLD: 0 K/UL (ref 0–0.01)
NRBC BLD-RTO: 0 PER 100 WBC
PLATELET # BLD AUTO: 307 K/UL (ref 150–400)
PMV BLD AUTO: 10.2 FL (ref 8.9–12.9)
POTASSIUM SERPL-SCNC: 4.7 MMOL/L (ref 3.5–5.1)
RBC # BLD AUTO: 4.06 M/UL (ref 3.8–5.2)
SODIUM SERPL-SCNC: 138 MMOL/L (ref 136–145)
TRIGL SERPL-MCNC: 68 MG/DL
VLDLC SERPL CALC-MCNC: 13.6 MG/DL
WBC # BLD AUTO: 9.3 K/UL (ref 3.6–11)

## 2024-06-27 PROCEDURE — 99395 PREV VISIT EST AGE 18-39: CPT | Performed by: NURSE PRACTITIONER

## 2024-06-27 RX ORDER — FLUTICASONE PROPIONATE 50 MCG
1 SPRAY, SUSPENSION (ML) NASAL NIGHTLY
Qty: 32 G | Refills: 1 | Status: SHIPPED | OUTPATIENT
Start: 2024-06-27

## 2024-06-27 SDOH — ECONOMIC STABILITY: FOOD INSECURITY: WITHIN THE PAST 12 MONTHS, YOU WORRIED THAT YOUR FOOD WOULD RUN OUT BEFORE YOU GOT MONEY TO BUY MORE.: NEVER TRUE

## 2024-06-27 SDOH — ECONOMIC STABILITY: FOOD INSECURITY: WITHIN THE PAST 12 MONTHS, THE FOOD YOU BOUGHT JUST DIDN'T LAST AND YOU DIDN'T HAVE MONEY TO GET MORE.: NEVER TRUE

## 2024-06-27 SDOH — ECONOMIC STABILITY: INCOME INSECURITY: HOW HARD IS IT FOR YOU TO PAY FOR THE VERY BASICS LIKE FOOD, HOUSING, MEDICAL CARE, AND HEATING?: NOT HARD AT ALL

## 2024-06-27 NOTE — PROGRESS NOTES
Pt is here for   Chief Complaint   Patient presents with    Annual Exam     With labs     \"Have you been to the ER, urgent care clinic since your last visit?  Hospitalized since your last visit?\"    NO    “Have you seen or consulted any other health care providers outside of [facility] since your last visit?”    NO     “Have you had a pap smear?”    NO    No cervical cancer screening on file             Click Here for Release of Records Request    
  Patient Labs were reviewed: yes  Patient Past Records were reviewed: yes  See orders below  Follow-up and Dispositions    Return in about 1 year (around 6/27/2025) for Physical with labs.             ICD-10-CM    1. Adult general medical examination  Z00.00 BS -  Referral to [facility] Clinical Specialist     Lipid Panel     Basic Metabolic Panel     CBC with Auto Differential      2. Iron deficiency anemia secondary to inadequate dietary iron intake  D50.8       3. Screening for endocrine, nutritional, metabolic and immunity disorder  Z13.29 Basic Metabolic Panel    Z13.21 CBC with Auto Differential    Z13.228     Z13.0       4. Screening for lipid disorders  Z13.220 Lipid Panel      5. Advance care planning  Z71.89 [facility] -  Referral to [facility] Clinical Specialist      6. Nasal polyps  J33.9 [facility] [address]     fluticasone (FLONASE) 50 MCG/ACT nasal spray      7. Environmental and seasonal allergies  J30.89 fluticasone (FLONASE) 50 MCG/ACT nasal spray              Rox Pineda expressed understanding of plan. An After Visit Summary was offered/printed and given to the patient.

## 2024-06-27 NOTE — ACP (ADVANCE CARE PLANNING)
Advanced care planning- discussed Advance directive, Medical POA, and life sustaining options. Advised of free virtual or in-person visit for advance care planning paperwork completion with ACP specialist. Referreal sent.     Advance Care Planning (ACP) Provider Conversation Snapshot    Date of ACP Conversation: 06/27/24  Persons included in Conversation:  Patient/family  Length of ACP Conversation in minutes:  5-10 minutes    Authorized Decision Maker (if patient is incapable of making informed decisions):   This person is:   Healthcare Agent/Medical Power of  under Advance Directive        For Patients with Decision Making Capacity:   Intubation, CPR, use of IVF/Nutrition, Tube Feedings, and organ donation options reviewed briefly    Conversation Outcomes / Follow-Up Plan:   Recommended completion of Advance Directive form after review of ACP materials and conversation with prospective healthcare agent     Referral made for ACP follow-up assistance to:  ACP facilitator/specialist    ====Advance Care Planning Invitation====    Patient was invited to begin or continue Advance Care Planning on this date and reviewed ACP materials in the office OR discussed in detail during virtual visit.    Recommended appointment with facilitator for ACP conversation regarding advance directives.    [x] Yes  [] No  Referral sent to ACP team member or Coordinator for follow-up    [] Yes  [x] No  Patient scheduled an appointment.       Site of Referral: [facility] [address]

## 2024-06-28 ENCOUNTER — CLINICAL DOCUMENTATION (OUTPATIENT)
Dept: SPIRITUAL SERVICES | Age: 34
End: 2024-06-28

## 2024-06-28 NOTE — PROGRESS NOTES
Advance Care Planning   Ambulatory ACP Specialist Patient Outreach    Date:  6/28/2024    ACP Specialist:  Kristina Sosa    Outreach call to patient in follow-up to ACP Specialist referral from:Clarita Alaniz APRN - NP    [x] PCP  [] Provider   [] Ambulatory Care Management [] Other     For:                  [x] Advance Directive Assistance              [] Complete Portable DNR order              [] Complete POST/POLST/MOST              [] Code Status Discussion             [] Discuss Goals of Care             [] Early ACP Decision-Making              [] Other (Specify)    Date Referral Received:6/27/24    Next Step:   [] ACP scheduled conversation  [x] Outreach again in one week               [] Email / Mail ACP Info Sheets  [] Email / Mail Advance Directive   [] Closing referral.  Routing closure to referring provider/staff and to ACP Specialist .    [] Closure letter mailed to patient with invitation to contact ACP Specialist if / when ready.   [] Other (Specify here):       [x] At this time, Healthcare Decision Maker Is:   Primary Decision Maker: Tiffani Borja [facility] - [phone]         [] Primary agent named in scanned advance directive.    [x] Legal Next of Kin.     [] Unable to determine legal decision maker at this time.    Outreaches:         [x] 1st -  Date:  6/28/24               Intervention:  [x] Spoke with Patient   [] Left Voice mail [] Email / Mail    [] [employer]  [] Other (Specify) :     Outcomes:  Outreach phone call to the patient on home phone number which is also listed as the mobile number.  Unable to reach, left voicemail message with call back information.  Will attempt to follow up within one week.           [] 2nd -  Date:                 Intervention:  [] Spoke with Patient  [] Left Voice mail [] Email / Mail    [] [employer]  [] Other (Specify) :              Outcomes:                [] 3rd -  Date:                Intervention:  [] Spoke with Patient   [] Left Voice

## 2024-07-03 ENCOUNTER — CLINICAL DOCUMENTATION (OUTPATIENT)
Dept: SPIRITUAL SERVICES | Age: 34
End: 2024-07-03

## 2024-07-10 ENCOUNTER — CLINICAL DOCUMENTATION (OUTPATIENT)
Dept: SPIRITUAL SERVICES | Age: 34
End: 2024-07-10

## (undated) DEVICE — CATHETER ABLAT 8FR L115CM TIP ELECTRD L4MM D-F CRV 1-4-1

## (undated) DEVICE — SOLID-TIP ABLATION CATHETER CABLE, STERILE CABLE: Brand: BLAZER™

## (undated) DEVICE — CATH RMFG SUP 5F 10MM --

## (undated) DEVICE — CABLE RMFG E SUPREME 150CM RED --

## (undated) DEVICE — INTRODUCER SHTH 6FR L12CM DIA0.038IN HEMSTAS CLOSE TOL

## (undated) DEVICE — SET TBNG L260CM IRRIG RF THER COOL PNT

## (undated) DEVICE — CABLE RMFG SUPREME BPTPLR/QPLR --

## (undated) DEVICE — CABLE RMFG COOL PATH --

## (undated) DEVICE — INTRODUCER SHTH 5FR L12CM SNAP LOK OBT 11FR DBL DST J STR

## (undated) DEVICE — PACK PROCEDURE SURG HRT CATH

## (undated) DEVICE — TEMPERATURE ABLATION CATHETER: Brand: BLAZER® II HTD

## (undated) DEVICE — CATH RMFG EP DCAPLR 6FR 125CM --

## (undated) DEVICE — SNAP KOVER: Brand: UNBRANDED

## (undated) DEVICE — CABLE FOR DIAGNOSTIC CATHETER: Brand: CABLE, SURELINK™

## (undated) DEVICE — INTRODUCER SHTH 8FR L63CM 8FR DIL GWIRE L145CM DIA0.038IN

## (undated) DEVICE — CABLE RMFG E SUPREME 150CM BLU --

## (undated) DEVICE — CATH RMFG ABLA SUPRM 5FRX120CM --